# Patient Record
Sex: FEMALE | Race: WHITE | Employment: OTHER | URBAN - METROPOLITAN AREA
[De-identification: names, ages, dates, MRNs, and addresses within clinical notes are randomized per-mention and may not be internally consistent; named-entity substitution may affect disease eponyms.]

---

## 2023-10-27 DIAGNOSIS — M79.672 FOOT PAIN, LEFT: ICD-10-CM

## 2023-10-30 PROBLEM — E03.9 HYPOTHYROIDISM: Status: ACTIVE | Noted: 2023-10-30

## 2023-10-30 PROBLEM — E78.5 HYPERLIPIDEMIA: Status: ACTIVE | Noted: 2023-10-30

## 2023-10-30 PROBLEM — G43.909 MIGRAINE: Status: ACTIVE | Noted: 2023-10-30

## 2023-10-30 PROBLEM — K21.9 GERD (GASTROESOPHAGEAL REFLUX DISEASE): Status: ACTIVE | Noted: 2023-10-30

## 2023-10-30 PROBLEM — E34.9 DISORDER OF ENDOCRINE SYSTEM: Status: ACTIVE | Noted: 2023-10-30

## 2023-10-30 RX ORDER — LEVOTHYROXINE SODIUM 75 UG/1
75 TABLET ORAL
COMMUNITY
End: 2024-05-13 | Stop reason: WASHOUT

## 2023-10-30 RX ORDER — HYDROXYCHLOROQUINE SULFATE 200 MG/1
400 TABLET, FILM COATED ORAL
COMMUNITY
Start: 2023-04-18 | End: 2024-04-04 | Stop reason: ALTCHOICE

## 2023-10-30 RX ORDER — TOPIRAMATE 100 MG/1
100 TABLET, FILM COATED ORAL NIGHTLY
COMMUNITY
End: 2024-04-04 | Stop reason: ALTCHOICE

## 2023-10-30 RX ORDER — TESTOSTERONE MICRONIZED 100 %
POWDER (GRAM) MISCELLANEOUS
COMMUNITY

## 2023-10-30 RX ORDER — CYCLOSPORINE 0.5 MG/ML
1 EMULSION OPHTHALMIC 2 TIMES DAILY
COMMUNITY

## 2023-10-31 ENCOUNTER — HOSPITAL ENCOUNTER (OUTPATIENT)
Dept: RADIOLOGY | Facility: HOSPITAL | Age: 73
Discharge: HOME | End: 2023-10-31
Payer: MEDICARE

## 2023-10-31 ENCOUNTER — OFFICE VISIT (OUTPATIENT)
Dept: ORTHOPEDIC SURGERY | Facility: CLINIC | Age: 73
End: 2023-10-31
Payer: MEDICARE

## 2023-10-31 VITALS — HEIGHT: 63 IN | WEIGHT: 125 LBS | BODY MASS INDEX: 22.15 KG/M2

## 2023-10-31 DIAGNOSIS — M79.672 FOOT PAIN, LEFT: ICD-10-CM

## 2023-10-31 DIAGNOSIS — L84 CORN OF TOE: Primary | ICD-10-CM

## 2023-10-31 PROCEDURE — 1036F TOBACCO NON-USER: CPT | Performed by: SPECIALIST

## 2023-10-31 PROCEDURE — 1159F MED LIST DOCD IN RCRD: CPT | Performed by: SPECIALIST

## 2023-10-31 PROCEDURE — 73630 X-RAY EXAM OF FOOT: CPT | Mod: LT

## 2023-10-31 PROCEDURE — 73630 X-RAY EXAM OF FOOT: CPT | Mod: LEFT SIDE | Performed by: RADIOLOGY

## 2023-10-31 PROCEDURE — 99204 OFFICE O/P NEW MOD 45 MIN: CPT | Performed by: SPECIALIST

## 2023-10-31 NOTE — PROGRESS NOTES
"Left foot pain for 2-3 years.  States her 1st and 2nd toes rub together and cause calluses.   Has seen a podiatrist multiple times who has cut them.     Xrays done today.     NPV-   History of Present Illness  73 y.o.femaleNo diagnosis found.  Mechanism of injury: None just painful corn  Date of Injury/Pain: Chronic  Location of pain: Left second and third toes  Quality of pain: Almost nerve pain like  Frequency of Pain: worse with walking or standing  Associated symptoms? Swelling.  Previous treatment?  none        27 point review of systems negative except what is stated in HPI  Lungs clear to auscultation, heart regular rate and rhythm no murmur  On examination of the left ankle/foot:  Normal gait  Normal alignment  Minimal swelling and no ecchymosis of the lateral foot. no erythema. Skin intact; no ulcers or lesions.  Left medial second toe and lateral great toe \"kissing\" corns, with minimal left second toe varus.  No instability.  Normal ROM in  ankle plantarflexion, dorsiflexion, inversion and eversion; minimal Normal ROM in 2-5th toe flexion/extension and 1st MTP flexion/extension  Normal strength in ankle plantarflexion, dorsiflexion, inversion and eversion; normal strength with great toe flexion/extension. No pain with eversion  Neurovascularly intact. Good capillary refill. No sensory deficit to light touch. Normal proprioception. Dorsalis pedis and posterior tibial pulses 2+ bilaterally.    I personally reviewed the patient's x-ray images and reports of the left foot. The xrays show no acute abnormalities just the above first and second toe condylar pressure-point. No other fractures or dislocation.  Normal joint spacing     Assessment: Left first webspace first and second toe corns.  Options would be first web spacers with extra depth wide toe box shoe wear versus surgical condylectomy of the lateral great toe IP and medial second toe IP.  Patient is going to discuss this option with family and work and " consider scheduling.    PLAN: Treatment options were discussed with the patient.

## 2023-11-02 ENCOUNTER — TELEPHONE (OUTPATIENT)
Dept: ORTHOPEDIC SURGERY | Facility: CLINIC | Age: 73
End: 2023-11-02
Payer: MEDICARE

## 2023-11-02 DIAGNOSIS — L84 CORN OF TOE: ICD-10-CM

## 2023-11-02 NOTE — TELEPHONE ENCOUNTER
Please let me know what surgery needs scheduled, special needs, time needed etc.     And I will call her and get her scheduled.  Thank you.

## 2023-11-02 NOTE — TELEPHONE ENCOUNTER
Patient called in to discuss possible surgery date options. She states it was discussed a few Wednesday's that were available in December. She would like to know if she can do surgery on December 6th, 13th, or 27th. Please call patient and advise.     She was seen 10/31/23 for her left foot and the surgery scheduled would be a left foot condylectomy of the lateral great toe IP and medial second toe IP.

## 2023-11-03 ENCOUNTER — PREP FOR PROCEDURE (OUTPATIENT)
Dept: ORTHOPEDIC SURGERY | Facility: CLINIC | Age: 73
End: 2023-11-03
Payer: MEDICARE

## 2023-12-12 ENCOUNTER — TELEMEDICINE CLINICAL SUPPORT (OUTPATIENT)
Dept: PREADMISSION TESTING | Facility: HOSPITAL | Age: 73
End: 2023-12-12
Payer: MEDICARE

## 2023-12-12 RX ORDER — ERGOCALCIFEROL 1.25 MG/1
1.25 CAPSULE ORAL
COMMUNITY

## 2023-12-12 RX ORDER — LEVOTHYROXINE SODIUM 75 UG/1
88 TABLET ORAL
COMMUNITY
End: 2024-04-04 | Stop reason: SINTOL

## 2023-12-12 RX ORDER — ASPIRIN 81 MG/1
81 TABLET ORAL DAILY
COMMUNITY
End: 2024-05-13 | Stop reason: WASHOUT

## 2023-12-12 RX ORDER — LANOLIN ALCOHOL/MO/W.PET/CERES
500 CREAM (GRAM) TOPICAL DAILY
COMMUNITY

## 2023-12-12 NOTE — PREPROCEDURE INSTRUCTIONS
Medication List            Accurate as of December 12, 2023 10:26 AM. Always use your most recent med list.                ascorbic acid 500 mg ER capsule  Commonly known as: Vitamin C     aspirin 81 mg EC tablet     ergocalciferol 1.25 MG (35965 UT) capsule  Commonly known as: Vitamin D-2     hydroxychloroquine 200 mg tablet  Commonly known as: Plaquenil     IVERMECTIN ORAL     MELATONIN ORAL     multivitamin with minerals iron-free  Commonly known as: Centrum Silver     Restasis 0.05 % ophthalmic emulsion  Generic drug: cycloSPORINE     * Synthroid 75 mcg tablet  Generic drug: levothyroxine     * levothyroxine 75 mcg tablet  Commonly known as: Synthroid, Levoxyl     testosterone micronized (bulk) 100 % powder     topiramate 100 mg tablet  Commonly known as: Topamax           * This list has 2 medication(s) that are the same as other medications prescribed for you. Read the directions carefully, and ask your doctor or other care provider to review them with you.                                  NPO Instructions:    Do not eat any food after midnight the night before your surgery/procedure.    Additional Instructions:     Wear  comfortable loose fitting clothing  Do not use moisturizers, creams, lotions or perfume  All jewelry and valuables should be left at home    Must have a   Stop baby aspirin and multi vitamin on 12/20

## 2023-12-19 ENCOUNTER — ANESTHESIA EVENT (OUTPATIENT)
Dept: OPERATING ROOM | Facility: HOSPITAL | Age: 73
End: 2023-12-19
Payer: MEDICARE

## 2023-12-27 ENCOUNTER — HOSPITAL ENCOUNTER (OUTPATIENT)
Facility: HOSPITAL | Age: 73
Setting detail: OUTPATIENT SURGERY
Discharge: HOME | End: 2023-12-27
Attending: SPECIALIST | Admitting: SPECIALIST
Payer: MEDICARE

## 2023-12-27 ENCOUNTER — ANESTHESIA (OUTPATIENT)
Dept: OPERATING ROOM | Facility: HOSPITAL | Age: 73
End: 2023-12-27
Payer: MEDICARE

## 2023-12-27 ENCOUNTER — PHARMACY VISIT (OUTPATIENT)
Dept: PHARMACY | Facility: CLINIC | Age: 73
End: 2023-12-27

## 2023-12-27 ENCOUNTER — APPOINTMENT (OUTPATIENT)
Dept: CARDIOLOGY | Facility: HOSPITAL | Age: 73
End: 2023-12-27
Payer: MEDICARE

## 2023-12-27 ENCOUNTER — APPOINTMENT (OUTPATIENT)
Dept: RADIOLOGY | Facility: HOSPITAL | Age: 73
End: 2023-12-27
Payer: MEDICARE

## 2023-12-27 VITALS
OXYGEN SATURATION: 100 % | WEIGHT: 135 LBS | RESPIRATION RATE: 18 BRPM | HEART RATE: 80 BPM | SYSTOLIC BLOOD PRESSURE: 163 MMHG | TEMPERATURE: 98.3 F | DIASTOLIC BLOOD PRESSURE: 70 MMHG | HEIGHT: 61 IN | BODY MASS INDEX: 25.49 KG/M2

## 2023-12-27 DIAGNOSIS — L84 CORN OF TOE: Primary | ICD-10-CM

## 2023-12-27 LAB
ANION GAP SERPL CALC-SCNC: 11 MMOL/L (ref 10–20)
BUN SERPL-MCNC: 17 MG/DL (ref 6–23)
CALCIUM SERPL-MCNC: 10 MG/DL (ref 8.6–10.3)
CHLORIDE SERPL-SCNC: 106 MMOL/L (ref 98–107)
CO2 SERPL-SCNC: 25 MMOL/L (ref 21–32)
CREAT SERPL-MCNC: 0.93 MG/DL (ref 0.5–1.05)
ERYTHROCYTE [DISTWIDTH] IN BLOOD BY AUTOMATED COUNT: 13.6 % (ref 11.5–14.5)
GFR SERPL CREATININE-BSD FRML MDRD: 65 ML/MIN/1.73M*2
GLUCOSE SERPL-MCNC: 85 MG/DL (ref 74–99)
HCT VFR BLD AUTO: 44.3 % (ref 36–46)
HGB BLD-MCNC: 15 G/DL (ref 12–16)
MCH RBC QN AUTO: 31.8 PG (ref 26–34)
MCHC RBC AUTO-ENTMCNC: 33.9 G/DL (ref 32–36)
MCV RBC AUTO: 94 FL (ref 80–100)
NRBC BLD-RTO: 0 /100 WBCS (ref 0–0)
PLATELET # BLD AUTO: 243 X10*3/UL (ref 150–450)
POTASSIUM SERPL-SCNC: 3.9 MMOL/L (ref 3.5–5.3)
RBC # BLD AUTO: 4.72 X10*6/UL (ref 4–5.2)
SODIUM SERPL-SCNC: 138 MMOL/L (ref 136–145)
WBC # BLD AUTO: 7.9 X10*3/UL (ref 4.4–11.3)

## 2023-12-27 PROCEDURE — 2500000005 HC RX 250 GENERAL PHARMACY W/O HCPCS: Performed by: NURSE ANESTHETIST, CERTIFIED REGISTERED

## 2023-12-27 PROCEDURE — 2500000004 HC RX 250 GENERAL PHARMACY W/ HCPCS (ALT 636 FOR OP/ED): Performed by: SPECIALIST

## 2023-12-27 PROCEDURE — 7100000010 HC PHASE TWO TIME - EACH INCREMENTAL 1 MINUTE: Performed by: SPECIALIST

## 2023-12-27 PROCEDURE — 36415 COLL VENOUS BLD VENIPUNCTURE: CPT | Performed by: ANESTHESIOLOGY

## 2023-12-27 PROCEDURE — 3600000008 HC OR TIME - EACH INCREMENTAL 1 MINUTE - PROCEDURE LEVEL THREE: Performed by: SPECIALIST

## 2023-12-27 PROCEDURE — 2500000004 HC RX 250 GENERAL PHARMACY W/ HCPCS (ALT 636 FOR OP/ED): Performed by: ANESTHESIOLOGY

## 2023-12-27 PROCEDURE — 3700000002 HC GENERAL ANESTHESIA TIME - EACH INCREMENTAL 1 MINUTE: Performed by: SPECIALIST

## 2023-12-27 PROCEDURE — 7100000002 HC RECOVERY ROOM TIME - EACH INCREMENTAL 1 MINUTE: Performed by: SPECIALIST

## 2023-12-27 PROCEDURE — 7100000001 HC RECOVERY ROOM TIME - INITIAL BASE CHARGE: Performed by: SPECIALIST

## 2023-12-27 PROCEDURE — RXMED WILLOW AMBULATORY MEDICATION CHARGE

## 2023-12-27 PROCEDURE — 28153 PARTIAL REMOVAL OF TOE: CPT | Performed by: SPECIALIST

## 2023-12-27 PROCEDURE — 93010 ELECTROCARDIOGRAM REPORT: CPT | Performed by: INTERNAL MEDICINE

## 2023-12-27 PROCEDURE — 3700000001 HC GENERAL ANESTHESIA TIME - INITIAL BASE CHARGE: Performed by: SPECIALIST

## 2023-12-27 PROCEDURE — 93005 ELECTROCARDIOGRAM TRACING: CPT

## 2023-12-27 PROCEDURE — 85027 COMPLETE CBC AUTOMATED: CPT | Performed by: ANESTHESIOLOGY

## 2023-12-27 PROCEDURE — 80048 BASIC METABOLIC PNL TOTAL CA: CPT | Performed by: ANESTHESIOLOGY

## 2023-12-27 PROCEDURE — 2500000004 HC RX 250 GENERAL PHARMACY W/ HCPCS (ALT 636 FOR OP/ED): Performed by: NURSE ANESTHETIST, CERTIFIED REGISTERED

## 2023-12-27 PROCEDURE — 2720000007 HC OR 272 NO HCPCS: Performed by: SPECIALIST

## 2023-12-27 PROCEDURE — 7100000009 HC PHASE TWO TIME - INITIAL BASE CHARGE: Performed by: SPECIALIST

## 2023-12-27 PROCEDURE — 3600000003 HC OR TIME - INITIAL BASE CHARGE - PROCEDURE LEVEL THREE: Performed by: SPECIALIST

## 2023-12-27 RX ORDER — SODIUM CHLORIDE 9 MG/ML
50 INJECTION, SOLUTION INTRAVENOUS CONTINUOUS
Status: DISCONTINUED | OUTPATIENT
Start: 2023-12-27 | End: 2023-12-27 | Stop reason: HOSPADM

## 2023-12-27 RX ORDER — DEXAMETHASONE SODIUM PHOSPHATE 4 MG/ML
INJECTION, SOLUTION INTRA-ARTICULAR; INTRALESIONAL; INTRAMUSCULAR; INTRAVENOUS; SOFT TISSUE AS NEEDED
Status: DISCONTINUED | OUTPATIENT
Start: 2023-12-27 | End: 2023-12-27

## 2023-12-27 RX ORDER — HYDRALAZINE HYDROCHLORIDE 20 MG/ML
5 INJECTION INTRAMUSCULAR; INTRAVENOUS ONCE
Status: COMPLETED | OUTPATIENT
Start: 2023-12-27 | End: 2023-12-27

## 2023-12-27 RX ORDER — ONDANSETRON HYDROCHLORIDE 2 MG/ML
4 INJECTION, SOLUTION INTRAVENOUS ONCE
Status: COMPLETED | OUTPATIENT
Start: 2023-12-27 | End: 2023-12-27

## 2023-12-27 RX ORDER — BUPIVACAINE HYDROCHLORIDE 5 MG/ML
INJECTION, SOLUTION PERINEURAL AS NEEDED
Status: DISCONTINUED | OUTPATIENT
Start: 2023-12-27 | End: 2023-12-27

## 2023-12-27 RX ORDER — MIDAZOLAM HYDROCHLORIDE 1 MG/ML
INJECTION, SOLUTION INTRAMUSCULAR; INTRAVENOUS AS NEEDED
Status: DISCONTINUED | OUTPATIENT
Start: 2023-12-27 | End: 2023-12-27

## 2023-12-27 RX ORDER — LIDOCAINE HYDROCHLORIDE AND EPINEPHRINE 20; 10 MG/ML; UG/ML
INJECTION, SOLUTION INFILTRATION; PERINEURAL AS NEEDED
Status: DISCONTINUED | OUTPATIENT
Start: 2023-12-27 | End: 2023-12-27

## 2023-12-27 RX ORDER — BUPIVACAINE HYDROCHLORIDE 2.5 MG/ML
INJECTION, SOLUTION EPIDURAL; INFILTRATION; INTRACAUDAL AS NEEDED
Status: DISCONTINUED | OUTPATIENT
Start: 2023-12-27 | End: 2023-12-27

## 2023-12-27 RX ORDER — HYDROCODONE BITARTRATE AND ACETAMINOPHEN 5; 325 MG/1; MG/1
1 TABLET ORAL EVERY 4 HOURS PRN
Qty: 15 TABLET | Refills: 0 | Status: SHIPPED | OUTPATIENT
Start: 2023-12-27 | End: 2024-04-04 | Stop reason: WASHOUT

## 2023-12-27 RX ORDER — CEFAZOLIN SODIUM 1 G/50ML
1 SOLUTION INTRAVENOUS ONCE
Status: COMPLETED | OUTPATIENT
Start: 2023-12-27 | End: 2023-12-27

## 2023-12-27 RX ORDER — MORPHINE SULFATE 2 MG/ML
2 INJECTION, SOLUTION INTRAMUSCULAR; INTRAVENOUS EVERY 5 MIN PRN
Status: DISCONTINUED | OUTPATIENT
Start: 2023-12-27 | End: 2023-12-27 | Stop reason: HOSPADM

## 2023-12-27 RX ORDER — ONDANSETRON HYDROCHLORIDE 2 MG/ML
4 INJECTION, SOLUTION INTRAVENOUS ONCE AS NEEDED
Status: DISCONTINUED | OUTPATIENT
Start: 2023-12-27 | End: 2023-12-27 | Stop reason: HOSPADM

## 2023-12-27 RX ORDER — LIDOCAINE HYDROCHLORIDE 20 MG/ML
INJECTION, SOLUTION INFILTRATION; PERINEURAL AS NEEDED
Status: DISCONTINUED | OUTPATIENT
Start: 2023-12-27 | End: 2023-12-27

## 2023-12-27 RX ORDER — ESMOLOL HYDROCHLORIDE 10 MG/ML
INJECTION INTRAVENOUS AS NEEDED
Status: DISCONTINUED | OUTPATIENT
Start: 2023-12-27 | End: 2023-12-27

## 2023-12-27 RX ORDER — MORPHINE SULFATE 4 MG/ML
4 INJECTION INTRAVENOUS EVERY 5 MIN PRN
Status: DISCONTINUED | OUTPATIENT
Start: 2023-12-27 | End: 2023-12-27 | Stop reason: HOSPADM

## 2023-12-27 RX ORDER — FAMOTIDINE 10 MG/ML
20 INJECTION INTRAVENOUS ONCE
Status: COMPLETED | OUTPATIENT
Start: 2023-12-27 | End: 2023-12-27

## 2023-12-27 RX ORDER — PROPOFOL 10 MG/ML
INJECTION, EMULSION INTRAVENOUS AS NEEDED
Status: DISCONTINUED | OUTPATIENT
Start: 2023-12-27 | End: 2023-12-27

## 2023-12-27 RX ORDER — FENTANYL CITRATE 50 UG/ML
INJECTION, SOLUTION INTRAMUSCULAR; INTRAVENOUS AS NEEDED
Status: DISCONTINUED | OUTPATIENT
Start: 2023-12-27 | End: 2023-12-27

## 2023-12-27 RX ADMIN — MIDAZOLAM HYDROCHLORIDE 2 MG: 1 INJECTION, SOLUTION INTRAMUSCULAR; INTRAVENOUS at 11:16

## 2023-12-27 RX ADMIN — BUPIVACAINE HYDROCHLORIDE 25 ML: 5 INJECTION, SOLUTION PERINEURAL at 11:16

## 2023-12-27 RX ADMIN — HYDRALAZINE HYDROCHLORIDE 5 MG: 20 INJECTION INTRAMUSCULAR; INTRAVENOUS at 14:15

## 2023-12-27 RX ADMIN — CEFAZOLIN SODIUM 1 G: 1 INJECTION, SOLUTION INTRAVENOUS at 11:59

## 2023-12-27 RX ADMIN — PROPOFOL 200 MG: 10 INJECTION, EMULSION INTRAVENOUS at 12:11

## 2023-12-27 RX ADMIN — LIDOCAINE HYDROCHLORIDE 50 MG: 20 INJECTION, SOLUTION INFILTRATION; PERINEURAL at 12:11

## 2023-12-27 RX ADMIN — DEXAMETHASONE SODIUM PHOSPHATE 4 MG: 4 INJECTION INTRA-ARTICULAR; INTRALESIONAL; INTRAMUSCULAR; INTRAVENOUS; SOFT TISSUE at 12:11

## 2023-12-27 RX ADMIN — BUPIVACAINE HYDROCHLORIDE 17 ML: 2.5 INJECTION, SOLUTION EPIDURAL; INFILTRATION; INTRACAUDAL; PERINEURAL at 11:16

## 2023-12-27 RX ADMIN — HYDRALAZINE HYDROCHLORIDE 5 MG: 20 INJECTION INTRAMUSCULAR; INTRAVENOUS at 13:40

## 2023-12-27 RX ADMIN — ESMOLOL HYDROCHLORIDE 20 MG: 10 INJECTION, SOLUTION INTRAVENOUS at 12:25

## 2023-12-27 RX ADMIN — DEXAMETHASONE SODIUM PHOSPHATE 8 MG: 4 INJECTION INTRA-ARTICULAR; INTRALESIONAL; INTRAMUSCULAR; INTRAVENOUS; SOFT TISSUE at 11:16

## 2023-12-27 RX ADMIN — ONDANSETRON 4 MG: 2 INJECTION INTRAMUSCULAR; INTRAVENOUS at 10:37

## 2023-12-27 RX ADMIN — LIDOCAINE HYDROCHLORIDE,EPINEPHRINE BITARTRATE 12 ML: 20; .01 INJECTION, SOLUTION INFILTRATION; PERINEURAL at 11:16

## 2023-12-27 RX ADMIN — FENTANYL CITRATE 100 MCG: 50 INJECTION, SOLUTION INTRAMUSCULAR; INTRAVENOUS at 11:16

## 2023-12-27 RX ADMIN — SODIUM CHLORIDE 50 ML/HR: 9 INJECTION, SOLUTION INTRAVENOUS at 10:37

## 2023-12-27 RX ADMIN — FAMOTIDINE 20 MG: 10 INJECTION, SOLUTION INTRAVENOUS at 10:37

## 2023-12-27 SDOH — HEALTH STABILITY: MENTAL HEALTH: CURRENT SMOKER: 0

## 2023-12-27 ASSESSMENT — COLUMBIA-SUICIDE SEVERITY RATING SCALE - C-SSRS
1. IN THE PAST MONTH, HAVE YOU WISHED YOU WERE DEAD OR WISHED YOU COULD GO TO SLEEP AND NOT WAKE UP?: NO
6. HAVE YOU EVER DONE ANYTHING, STARTED TO DO ANYTHING, OR PREPARED TO DO ANYTHING TO END YOUR LIFE?: NO
2. HAVE YOU ACTUALLY HAD ANY THOUGHTS OF KILLING YOURSELF?: NO

## 2023-12-27 ASSESSMENT — PAIN SCALES - GENERAL
PAINLEVEL_OUTOF10: 0 - NO PAIN
PAIN_LEVEL: 0
PAINLEVEL_OUTOF10: 0 - NO PAIN

## 2023-12-27 ASSESSMENT — PAIN - FUNCTIONAL ASSESSMENT

## 2023-12-27 NOTE — ANESTHESIA POSTPROCEDURE EVALUATION
Patient: Nanette Deal    Procedure Summary       Date: 12/27/23 Room / Location: [facility] 03 / [facility]    Anesthesia Start: 1159 Anesthesia Stop: 1250    Procedure: Left great toe and second toe condylectomy's.general + block. (Left: Toes) Diagnosis:       Corn of toe      (Corn of toe [L84])    Surgeons: Tato Pardo MD Responsible Provider: MAMIE Will    Anesthesia Type: general ASA Status: 2            Anesthesia Type: general    Vitals Value Taken Time   /70 12/27/23 1440   Temp 36.8 °C (98.3 °F) 12/27/23 1440   Pulse 80 12/27/23 1440   Resp 18 12/27/23 1440   SpO2 100 % 12/27/23 1440       Anesthesia Post Evaluation    Patient location during evaluation: PACU  Patient participation: complete - patient participated  Level of consciousness: awake and alert  Pain score: 0  Pain management: adequate  Multimodal analgesia pain management approach  Airway patency: patent  Cardiovascular status: acceptable  Respiratory status: acceptable  Hydration status: acceptable  Postoperative Nausea and Vomiting: none  Comments: POSTOP SYSTOLIC HTN TX'D WITH APRESOLINE 10 mg TITRATED.  PREOP SYSTOLIC 200's.  LAST SYSTOLIC 160 IN PACU TRENDING DOWN.  PT INSTRUCTED TO F/U WITH PCP ASAP TO HAVE BP EVALUATED. PT AGREES.    No notable events documented.

## 2023-12-27 NOTE — ANESTHESIA PROCEDURE NOTES
Peripheral Block    Start time: 12/27/2023 11:15 AM  End time: 12/27/2023 11:46 AM  Reason for block: at surgeon's request and post-op pain management  Staffing  Performed: CRNA   Authorized by: MAMIE Will    Performed by: MAMIE Rowe  Preanesthetic Checklist  Completed: patient identified, IV checked, site marked, risks and benefits discussed, surgical consent, monitors and equipment checked, pre-op evaluation and timeout performed   Timeout performed at: 12/27/2023 10:51 AM  Peripheral Block  Patient position: laying flat  Prep: ChloraPrep  Patient monitoring: heart rate, cardiac monitor and continuous pulse ox  Block type: other (saphenous)  Laterality: right  Injection technique: single-shot  Local infiltration: bupivicaine  Infiltration strength: 0.3 %  Dose: 17 mL  Needle  Needle type: 25g x 1.5 inch.   Needle localization: anatomical landmarks  Test dose: negative  Assessment  Injection assessment: negative aspiration for heme, no paresthesia on injection and incremental injection  Paresthesia pain: none  Heart rate change: no  Slow fractionated injection: yes  Additional Notes  See block note #1.  Subpatellar Saphenous block placed easily, sterile, X1.  Injected as above. Tolerated very well. Excellent early effect.

## 2023-12-27 NOTE — ANESTHESIA PROCEDURE NOTES
Airway  Date/Time: 12/27/2023 12:06 PM  Urgency: elective    Airway not difficult    Staffing  Performed: CRNA   Authorized by: MAMIE Will    Performed by: AMMIE Will  Patient location during procedure: OR    Indications and Patient Condition  Indications for airway management: anesthesia  Spontaneous ventilation: present  Sedation level: deep  Preoxygenated: yes  Patient position: sniffing  Mask difficulty assessment: 1 - vent by mask    Final Airway Details  Final airway type: supraglottic airway      Successful airway: classic  Size 4     Number of attempts at approach: 1

## 2023-12-27 NOTE — ANESTHESIA PREPROCEDURE EVALUATION
Patient: Nanette Deal    Procedure Information       Date/Time: 12/27/23 1200    Procedure: Left great toe and second toe condylectomy's.general + block. (Left: Toes) - general + block. CPT 28153 x2, surgery 30 minutes, small sagittal saw.    Location: [facility] 03 / [facility]    Surgeons: Tato Pardo MD            Relevant Problems   Cardiovascular   (+) Hyperlipidemia      Endocrine   (+) Hypothyroidism      GI   (+) GERD (gastroesophageal reflux disease)       Clinical information reviewed:   Tobacco  Allergies  Meds  Problems  Med Hx  Surg Hx   Fam Hx  Soc   Hx        NPO Detail:  NPO/Void Status  Date of Last Liquid: 12/26/23  Time of Last Liquid: 1700  Date of Last Solid: 12/26/23  Time of Last Solid: 1900  Last Intake Type: Clear fluids         Physical Exam    Airway  Mallampati: II  TM distance: >3 FB  Neck ROM: full     Cardiovascular - normal exam  Rhythm: regular  Rate: normal     Dental - normal exam     Pulmonary - normal exam  Breath sounds clear to auscultation     Abdominal   Abdomen: soft           Anesthesia Plan    ASA 2     general and regional   (Block with GA discussed)  The patient is not a current smoker.    intravenous induction   Anesthetic plan and risks discussed with patient.  Use of blood products discussed with patient who.    Plan discussed with CRNA.

## 2023-12-27 NOTE — H&P
history Of Present Illness  Nanette Deal is a 73 y.o. female presenting with here for surgical correction left foot first web:.     Past Medical History  She has a past medical history of Breast cancer (CMS/[facility]), Disease of thyroid gland, GERD (gastroesophageal reflux disease), and Hyperlipidemia.    Surgical History  She has a past surgical history that includes Mastectomy (02/20/2018) and Hysterectomy (02/20/2018).     Social History  She reports that she has never smoked. She has never used smokeless tobacco. She reports that she does not drink alcohol and does not use drugs.    Family History  Family History   Problem Relation Name Age of Onset    Stroke Mother      Diabetes Father          Allergies  Meperidine       Lungs clear to auscultation  Heart regular rate and rhythm, no murmur normal S1-S2  Left foot with painful corn lateral great toe IPJ and medial second toe PIP     Last Recorded Vitals  There were no vitals taken for this visit.    Relevant Results      Scheduled medications  ceFAZolin, 1 g, intravenous, Once  famotidine, 20 mg, intravenous, Once  ondansetron, 4 mg, intravenous, Once      Continuous medications  sodium chloride 0.9%, 50 mL/hr      PRN medications    No results found for this or any previous visit (from the past 24 hour(s)).    Assessment/Plan   Principal Problem:    Corn of toe      Patient has informed consent signed for condylectomy's left great toe and second toe.       I spent 10 minutes in the professional and overall care of this patient.      Tato Pardo MD

## 2023-12-27 NOTE — ANESTHESIA PROCEDURE NOTES
Peripheral Block    Patient location during procedure: pre-op  Start time: 12/27/2023 11:15 AM  End time: 12/27/2023 11:46 AM  Reason for block: at surgeon's request and post-op pain management  Staffing  Performed: CRNA   Authorized by: MAMIE Will    Performed by: MAMIE Rowe  Preanesthetic Checklist  Completed: patient identified, IV checked, site marked, risks and benefits discussed, surgical consent, monitors and equipment checked, pre-op evaluation and timeout performed   Timeout performed at: 12/27/2023 10:51 AM  Peripheral Block  Patient position: prone.  Prep: ChloraPrep  Patient monitoring: heart rate, cardiac monitor and continuous pulse ox  Block type: popliteal  Laterality: right  Injection technique: single-shot  Guidance: nerve stimulator  Local infiltration: ropivacaine  Infiltration strength: 0.5 %  Dose: 20 mL  Needle  Needle type: stimulator.   Needle gauge: 21 G  Needle localization: anatomical landmarks and nerve stimulator  Test dose: negative and lidocaine 1.5% with epinephrine 1-to-200,000  Assessment  Injection assessment: negative aspiration for heme, no paresthesia on injection and incremental injection  Paresthesia pain: none  Heart rate change: no  Slow fractionated injection: yes  Additional Notes  Anesthesia consult was placed by  _________Edvin__ for post procedural analgesia. The patients chart was reviewed and they were deemed an appropriate candidate for the procedure. The patient was educated in detail on the risks, benefits, and alternatives to the block including but not limited to: temporary or permanent nerve damage, bleeding, and infection, damage to surrounding tissues, possible block failure, and the potential for local anesthesia toxicity syndrome. The patient expressed understanding and all questions were answered prior to initiation of the procedure. Informed consent was also signed by the patient and laterality determined per institutional  "policy. A formal \"time out\" consistent with the institutions rules and regulations were performed by the anesthesia provider and appropriate RN.    Procedure  The patient was placed in the PRONE/LATERAL/SUPINE position. The LEFT/RIGHT side was marked and skin prep applied and allowed to dry. Proper monitors were applied with oxygen. Sedation was provided by administering:    Versed ____2_ mg IV  Fentanyl __100__mcg IV    A high frequency linear ultrasound probe with probe cover was placed over the popliteal fossa and sciatic nerve with popliteal vascular structures identified using sterile coupling gel following institutional skin prep.  The popliteal vein was easily collapsible, and popliteal artery found to be grossly normal under color Doppler flow prior to the procedure. An ECHOGENIC BLOCK NEEDLE was then advanced maintaining an in-plane visualization throughout the procedure, under ultrasound guidance from lateral to medial, to come to rest just deep to the sciatic neurovascular sheath at the level of the bifurcation, but avoiding contact of the common peroneal nerve. A positive motor response was visualized from the nerve stimulator. A loss of response was seen at 0.46______ mAmp. Upon negative aspiration, 5ml 2% Lidocaine, 20ml 0.5% Bupivacaine with 4mg decadron preservative free was administered safely and cautiously around the nerve structure. Aspiration every 3-5 ml was done to avoid potential intravascular injection.  All injections were done without resistance and were free of blood.  The patient tolerated the procedure well without report of intense pain, tinnitus, metallic taste, or circumoral numbness.  The block was then evaluated after a few minutes and appeared to be functioning appropriately.                "

## 2023-12-27 NOTE — OP NOTE
Left great toe and second toe condylectomy's.general + block. (L) Operative Note     Date: 2023  OR Location: [facility]    Name: Nanette Deal, : 1950, Age: 73 y.o., MRN: [id number], Sex: female    Diagnosis  Pre-op Diagnosis     * Corn of toe [L84] Post-op Diagnosis     * Corn of toe [L84]     Procedures  Left great toe and second toe condylectomy's.general + block.  81415 - IN RESECTION CONDYLE DISTAL END PHALANX EACH TOE      Surgeons      * Tato Pardo - Primary    Resident/Fellow/Other Assistant:  Surgeon(s) and Role:    Procedure Summary  Anesthesia: General  ASA: II  Anesthesia Staff: CRNA: CHANDNI Will-CRNA  Estimated Blood Loss: 2 mL  Intra-op Medications: * No intraprocedure medications in log *           Anesthesia Record               Intraprocedure I/O Totals       None           Specimen: No specimens collected     Staff:   Circulator: Frances Lowery RN  Scrub Person: Leeann Wilder         Drains and/or Catheters: * None in log *    Tourniquet Times:   * Missing tourniquet times found for documented tourniquets in lo *     Implants:     Findings: Prominent condyles great toe and second toe with associated callus    Indications: Nanette Deal is an 73 y.o. female who is having surgery for Corn of toe [L84].  Failed conservative treatment wished to proceed with surgery    The patient was seen in the preoperative area. The risks, benefits, complications, treatment options, non-operative alternatives, expected recovery and outcomes were discussed with the patient. The possibilities of reaction to medication, pulmonary aspiration, injury to surrounding structures, bleeding, recurrent infection, the need for additional procedures, failure to diagnose a condition, and creating a complication requiring transfusion or operation were discussed with the patient. The patient concurred with the proposed plan, giving informed consent.  The site of surgery was properly  noted/marked if necessary per policy. The patient has been actively warmed in preoperative area. Preoperative antibiotics have been ordered and given within 1 hours of incision. Venous thrombosis prophylaxis have been ordered including unilateral sequential compression device    Procedure Details: Patient brought to the OR and placed supine on the OR table after successful popliteal block.  The left lower extremity was then sterilely prepped and draped below a calf tourniquet.  At the start of the case we exsanguinated the left foot and ankle in excess elevated the tourniquet to 250 mmHg.  With a 15 blade we made a dorsal longitudinal incision over the left second toe PIP joint and the right great toe IP joint.  After sharp dissection of the dermis on both toes Sharp and blunt dissection expose the lateral condyle of the great toe IP joint and the medial condyle of the second toe PIP joint.  With a sagittal saw the condyles of the subsequent toes were removed.  We then copiously irrigated both surgical sites and closed the incisions with interrupted two 4-0 nylon suture.  Sterile dressing was applied tourniquet was let down tourniquet time under half hour and there is good perfusion of the foot at the end of the case  Complications:  None; patient tolerated the procedure well.    Disposition: PACU - hemodynamically stable.  Condition: stable         Additional Details: None    Attending Attestation: I was present for the entire procedure.    Tato Pardo  Phone Number: [phone]

## 2023-12-28 LAB
ATRIAL RATE: 68 BPM
P AXIS: 68 DEGREES
PR INTERVAL: 130 MS
Q ONSET: 251 MS
QRS COUNT: 11 BEATS
QRS DURATION: 90 MS
QT INTERVAL: 413 MS
QTC CALCULATION(BAZETT): 443 MS
QTC FREDERICIA: 432 MS
R AXIS: 73 DEGREES
T AXIS: 56 DEGREES
T OFFSET: 457 MS
VENTRICULAR RATE: 69 BPM

## 2023-12-28 ASSESSMENT — PAIN SCALES - GENERAL: PAINLEVEL_OUTOF10: 0 - NO PAIN

## 2024-01-09 ENCOUNTER — OFFICE VISIT (OUTPATIENT)
Dept: ORTHOPEDIC SURGERY | Facility: CLINIC | Age: 74
End: 2024-01-09
Payer: MEDICARE

## 2024-01-09 DIAGNOSIS — L84 CORN OF TOE: Primary | ICD-10-CM

## 2024-01-09 PROCEDURE — 1126F AMNT PAIN NOTED NONE PRSNT: CPT | Performed by: SPECIALIST

## 2024-01-09 PROCEDURE — 1036F TOBACCO NON-USER: CPT | Performed by: SPECIALIST

## 2024-01-09 PROCEDURE — 99024 POSTOP FOLLOW-UP VISIT: CPT | Performed by: SPECIALIST

## 2024-01-09 NOTE — PROGRESS NOTES
Left great toe and second toe condylectomy's.   12/27/2023 - suture removal today with no concerns     Exam: Left foot great toe and second toe incision is well-healed minimal swelling normal neurovascular status.  No signs of infection.  Dermis intact.    Plan: Sutures removed today Steri-Strips applied.  She can begin showering the foot.  Will do a reassessment final in a month.  Told patient she may require final callus debridement when skin more stable.

## 2024-02-02 ENCOUNTER — APPOINTMENT (OUTPATIENT)
Dept: PRIMARY CARE | Facility: CLINIC | Age: 74
End: 2024-02-02
Payer: MEDICARE

## 2024-02-06 ENCOUNTER — OFFICE VISIT (OUTPATIENT)
Dept: ORTHOPEDIC SURGERY | Facility: CLINIC | Age: 74
End: 2024-02-06
Payer: MEDICARE

## 2024-02-06 VITALS — HEIGHT: 61 IN | BODY MASS INDEX: 25.49 KG/M2 | WEIGHT: 135 LBS

## 2024-02-06 DIAGNOSIS — L84 CORN OF TOE: Primary | ICD-10-CM

## 2024-02-06 PROCEDURE — 99024 POSTOP FOLLOW-UP VISIT: CPT | Performed by: SPECIALIST

## 2024-02-06 PROCEDURE — 1159F MED LIST DOCD IN RCRD: CPT | Performed by: SPECIALIST

## 2024-02-06 PROCEDURE — 1126F AMNT PAIN NOTED NONE PRSNT: CPT | Performed by: SPECIALIST

## 2024-02-06 PROCEDURE — 1036F TOBACCO NON-USER: CPT | Performed by: SPECIALIST

## 2024-02-06 NOTE — PROGRESS NOTES
Left great toe and second toe condylectomy's.   12/27/2023    Doing well, walking well with normal shoes on    Exam: Left foot with minimal swelling to the second and first toes.  Incisions well-healed.  Painful callus has resolved.  There is normal vascular status no other significant findings.    Assessment/plan: Status post successful condylectomy's left great and second toes.  Resume all normal shoewear and activities as of tolerated.  Follow-up

## 2024-04-04 ENCOUNTER — OFFICE VISIT (OUTPATIENT)
Dept: PRIMARY CARE | Facility: CLINIC | Age: 74
End: 2024-04-04
Payer: MEDICARE

## 2024-04-04 VITALS
HEIGHT: 64 IN | BODY MASS INDEX: 23.39 KG/M2 | HEART RATE: 74 BPM | WEIGHT: 137 LBS | OXYGEN SATURATION: 99 % | DIASTOLIC BLOOD PRESSURE: 86 MMHG | TEMPERATURE: 97.4 F | SYSTOLIC BLOOD PRESSURE: 180 MMHG

## 2024-04-04 DIAGNOSIS — G71.00 MUSCULAR DYSTROPHY (MULTI): ICD-10-CM

## 2024-04-04 DIAGNOSIS — Z78.0 ASYMPTOMATIC POSTMENOPAUSAL STATE: ICD-10-CM

## 2024-04-04 DIAGNOSIS — E78.2 MIXED HYPERLIPIDEMIA: ICD-10-CM

## 2024-04-04 DIAGNOSIS — Z00.00 ROUTINE GENERAL MEDICAL EXAMINATION AT A HEALTH CARE FACILITY: Primary | ICD-10-CM

## 2024-04-04 DIAGNOSIS — Z85.3 HX: BREAST CANCER: ICD-10-CM

## 2024-04-04 DIAGNOSIS — E03.9 ACQUIRED HYPOTHYROIDISM: ICD-10-CM

## 2024-04-04 DIAGNOSIS — M85.80 OSTEOPENIA, SENILE: ICD-10-CM

## 2024-04-04 DIAGNOSIS — I10 PRIMARY HYPERTENSION: ICD-10-CM

## 2024-04-04 PROBLEM — Z90.710 H/O: HYSTERECTOMY: Status: ACTIVE | Noted: 2022-09-13

## 2024-04-04 PROCEDURE — 3079F DIAST BP 80-89 MM HG: CPT | Performed by: FAMILY MEDICINE

## 2024-04-04 PROCEDURE — 99203 OFFICE O/P NEW LOW 30 MIN: CPT | Performed by: FAMILY MEDICINE

## 2024-04-04 PROCEDURE — 1036F TOBACCO NON-USER: CPT | Performed by: FAMILY MEDICINE

## 2024-04-04 PROCEDURE — 3077F SYST BP >= 140 MM HG: CPT | Performed by: FAMILY MEDICINE

## 2024-04-04 PROCEDURE — 1159F MED LIST DOCD IN RCRD: CPT | Performed by: FAMILY MEDICINE

## 2024-04-04 RX ORDER — LOSARTAN POTASSIUM 25 MG/1
25 TABLET ORAL DAILY
Qty: 90 TABLET | Refills: 3 | Status: SHIPPED | OUTPATIENT
Start: 2024-04-04 | End: 2025-04-04

## 2024-04-04 ASSESSMENT — ENCOUNTER SYMPTOMS
DEPRESSION: 0
LOSS OF SENSATION IN FEET: 0
OCCASIONAL FEELINGS OF UNSTEADINESS: 0

## 2024-04-04 ASSESSMENT — PATIENT HEALTH QUESTIONNAIRE - PHQ9
1. LITTLE INTEREST OR PLEASURE IN DOING THINGS: NOT AT ALL
2. FEELING DOWN, DEPRESSED OR HOPELESS: NOT AT ALL
SUM OF ALL RESPONSES TO PHQ9 QUESTIONS 1 AND 2: 0

## 2024-04-04 NOTE — PROGRESS NOTES
"Subjective   Patient ID: Nanette Deal is a 74 y.o. female who presents for Hypertension (Was refused surgery because BP was too high).  74-year-old female presenting for transfer of care.    Past medical history significant for breast cancer status postmastectomy,osteopenia GERD, hyperlipidemia, migraine, hypothyroidism    Follows w/ Dr. Kearns   Hormones     Breast cancer diagnosis in 1986 and status postmastectomy of left breast and breast reconstruction    Last labs completed in December 2023  BMP within normal limits CBC within normal limits  Other Specialists:   Podiatry    Screening Tests:   Former Smoker: Never  Current Smoker: Never  0 pack year Smoking History  Lung Cancer Screening: Not indicated  Colon Cancer Screening: 3 colonoscopies; last being couple years; recall 10 years   Breast Cancer Screening: last mammogram 2021  Doesn't want to get   Cervical Cancer Screening: No longer indicated, status post hysterectomy  Bone density screening:      Objective   /86 (BP Location: Left arm, Patient Position: Sitting, BP Cuff Size: Adult)   Pulse 74   Temp 36.3 °C (97.4 °F) (Skin)   Ht 1.626 m (5' 4\")   Wt 62.1 kg (137 lb)   SpO2 99%   BMI 23.52 kg/m²     Physical Exam  Constitutional:       General: She is not in acute distress.     Appearance: Normal appearance. She is not ill-appearing, toxic-appearing or diaphoretic.   HENT:      Head: Normocephalic and atraumatic.   Eyes:      Extraocular Movements: Extraocular movements intact.      Pupils: Pupils are equal, round, and reactive to light.   Cardiovascular:      Rate and Rhythm: Normal rate and regular rhythm.      Pulses: Normal pulses.      Heart sounds: Normal heart sounds.   Pulmonary:      Effort: Pulmonary effort is normal.      Breath sounds: Normal breath sounds.   Neurological:      Mental Status: She is alert.         Assessment/Plan   Diagnoses and all orders for this visit:  Routine general medical examination at a [facility]" facility  Osteopenia, senile  Muscular dystrophy (CMS/[facility])  HX: breast cancer  Acquired hypothyroidism  Mixed hyperlipidemia  Asymptomatic postmenopausal state  Primary hypertension  -     losartan (Cozaar) 25 mg tablet; Take 1 tablet (25 mg) by mouth once daily.    Nanette Deal is a 74-year-old female presenting for establish care and annual Medicare wellness exam.  Today we discussed routine screenings including colonoscopy and mammogram.  Last colonoscopy was within the last couple of years.  Her last mammogram was done in 2021 and she is not interested in getting another one despite her history of breast cancer in her 30s.  She understands the risks versus benefits.  She gets lab work done with her physician she sees an echo and Dr. Kearns.  She will send over lab results when she gets this done with Dr. Kearns.  Will start her on losartan 25 mg for blood pressure control.  Follow-up in 3 months.  She will take her blood pressure at home at least once daily.  Discussed that she can message sooner if her blood pressure is consistently below 110 systolic.  Since her blood pressures at home are typically better controlled than in the office.    Ivory Joy DO     I spent a total of 34 minutes on the date of the service which included preparing to see the patient, face-to-face patient care, completing clinical documentation, performing a medically appropriate examination, counseling and educating the patient/family/caregiver and ordering medications, tests, or procedures.

## 2024-04-15 ENCOUNTER — TELEPHONE (OUTPATIENT)
Dept: PRIMARY CARE | Facility: CLINIC | Age: 74
End: 2024-04-15
Payer: MEDICARE

## 2024-04-15 NOTE — TELEPHONE ENCOUNTER
Pt left a msg stating that her surgeon is looking for a medical clearance.  I see she had an appt on 4/4.  She said to call her for the fax #.

## 2024-04-15 NOTE — TELEPHONE ENCOUNTER
Patient is calling the surgeons office and requesting a clearance form be sent to Dr. Joy.  We were unaware of a need for surgical clearance and patient may require another visit in order to clear as her BP was elevated previously.

## 2024-04-15 NOTE — TELEPHONE ENCOUNTER
Left a voicemail and sent a My Chart message inquiring as to what kind of surgery she is having.  [place] mentioned at [facility] [place]4-24

## 2024-04-23 ENCOUNTER — OFFICE VISIT (OUTPATIENT)
Dept: PRIMARY CARE | Facility: CLINIC | Age: 74
End: 2024-04-23
Payer: MEDICARE

## 2024-04-23 VITALS
SYSTOLIC BLOOD PRESSURE: 164 MMHG | BODY MASS INDEX: 23.28 KG/M2 | OXYGEN SATURATION: 99 % | WEIGHT: 135.6 LBS | TEMPERATURE: 97.5 F | DIASTOLIC BLOOD PRESSURE: 82 MMHG | HEART RATE: 74 BPM

## 2024-04-23 DIAGNOSIS — Z01.818 PREOPERATIVE CLEARANCE: Primary | ICD-10-CM

## 2024-04-23 DIAGNOSIS — R94.31 ABNORMAL EKG: ICD-10-CM

## 2024-04-23 DIAGNOSIS — R00.2 PALPITATIONS: ICD-10-CM

## 2024-04-23 DIAGNOSIS — I49.9 CARDIAC ARRHYTHMIA, UNSPECIFIED CARDIAC ARRHYTHMIA TYPE: ICD-10-CM

## 2024-04-23 NOTE — PROGRESS NOTES
Subjective   Patient ID: Nanette Deal is a 74 y.o. female who presents for Surgical Clearance.    Nanette Deal  is here today for a pre-op clearance.  Blood pressure at home is 105-110    Date of surgery: May 7   Who is the doctor performing the surgery: Dr. Lees  What is the patient having done: Neck Lift & & FaceLift   Does the patient have a form that needs to be filled out: yes  Tests required for clearance: CBC, EKG     RCRI risk factors:   High-risk surgery (intrathoracic, intraabdominal or vascular surgery): no  Coronary artery disease or MI: no  Congestive heart failure:no  Stroke: no  Insulin treatment for diabetes mellitus:  no  Preoperative serum creatinine > 2.0 mg/dL: no    History of problems with anesthesia: nausea to demerol in 80's   Family history of problems with anesthesia: unknown  History of difficult intubation: no  Exercise capacity: able to walk four blocks without symptoms.  Lifestyle factors: denies alcohol and tobacco use.  Symptoms: none, no chest pain, no dyspnea, no edema and no palpitations.        The American College of Cardiology recommends use of a validated tool to assess risk for major adverse cardiac events in order to complete preoperative risk assessment (surgical clearance evaluation).     One such tool is the RCRI score (1 point for each).   High-risk surgery (intrathoracic, intraabdominal or vascular surgery):  Coronary artery disease or MI:0  Congestive heart failure: 0  Stroke or TIA: 0  Insulin treatment for diabetes mellitus: 0  Preoperative serum creatinine > 2.0 mg/dL: 0    You have 0 of these (score 0).     A score of 0 or 1 indicates low risk of major cardiac adverse events.  If risk by this calculator is elevated (2 or more), risk is determined by functional capacity.    If excellent functional capacity- 10 mets or more, such as strenuous sports participation- clear for surgery.    If good functional capacity- 7-10 mets, such as run a short distance,  climb stairs, doubles tennis- clear for surgery.    If moderate functional capacity- 4-6 mets, such as walk on level ground at 4mph, do light housework- clear for surgery.    If poor functional capacity- 1-3 mets, such as eating, dressing, walking a block or two at 2-3 mph, but cardiac symptoms at any higher activity- need cardiac consultation, consideration of stress test or cath before procedure.      You are at good  functional capacity.       Objective   /82 (BP Location: Left arm, Patient Position: Sitting, BP Cuff Size: Adult)   Pulse 74   Temp 36.4 °C (97.5 °F) (Skin)   Wt 61.5 kg (135 lb 9.6 oz)   SpO2 99%   BMI 23.28 kg/m²     Physical Exam  Constitutional:       General: She is not in acute distress.     Appearance: Normal appearance. She is not ill-appearing, toxic-appearing or diaphoretic.   HENT:      Head: Normocephalic and atraumatic.   Eyes:      Extraocular Movements: Extraocular movements intact.      Pupils: Pupils are equal, round, and reactive to light.   Cardiovascular:      Rate and Rhythm: Normal rate and regular rhythm.      Pulses: Normal pulses.      Heart sounds: Normal heart sounds.   Pulmonary:      Effort: Pulmonary effort is normal.      Breath sounds: Normal breath sounds.   Neurological:      Mental Status: She is alert.       Assessment/Plan   Diagnoses and all orders for this visit:  Preoperative clearance  -     ECG 12 lead (Clinic Performed)  -     CBC; Future    Presents for preoperative clearance.  He is having surgery on May 5 with Dr. Lees for neck and facelift.  Overall health is good and she is low risk for procedure however he first in office EKG that was completed did show abnormality in V3 and therefore the EKG was repeated repeat EKG showed irregularity however unclear about clinical correlation with the abnormal EKG.  There were some T wave discrepancy in lead V3 and V6.  However the remaining leads show normal sinus rhythm.  We repeated the EKG a  second time to ensure that this was not just artifact and lead placement and so adjusting meds were made accordingly and the EKG repeated with the same results.  When comparing this to an EKG that was completed in December 2023 this finding is new..  I will send this EKG over to Dr. Bonilla to review to determine next best steps and if further testing needs to be completed.  However until I have further information and input from cardiology I cannot confirm that she is medically optimized for surgery.      Lab work as ordered.  She does have history of whitecoat hypertension.  In office her blood pressure was 164/82 but home blood pressure readings are in the 100-110 range.        Ivory Joy,      I spent a total of 45 minutes on the date of the service which included preparing to see the patient, face-to-face patient care, completing clinical documentation, performing a medically appropriate examination, counseling and educating the patient/family/caregiver and ordering medications, tests, or procedures.

## 2024-04-24 ENCOUNTER — TELEPHONE (OUTPATIENT)
Dept: PRIMARY CARE | Facility: CLINIC | Age: 74
End: 2024-04-24
Payer: MEDICARE

## 2024-04-24 NOTE — TELEPHONE ENCOUNTER
Hellen, from [facility], left a msg wanting to know why the pt needs cardia clearance since the EKG you sent says normal sinus rhythm.  The patient is now having anxiety that she has to have multiple tests done with cardiology, and that it's going to cause issues with rescheduling her surgery.  Please call and explain why the extra cardiology testing is necessary.

## 2024-04-25 ENCOUNTER — TELEPHONE (OUTPATIENT)
Dept: PRIMARY CARE | Facility: CLINIC | Age: 74
End: 2024-04-25
Payer: MEDICARE

## 2024-04-25 NOTE — TELEPHONE ENCOUNTER
Lvm for patient to call back regarding surgical clearance and cardiac clearance.  Would like to speak with the patient regarding this.

## 2024-04-30 ENCOUNTER — HOSPITAL ENCOUNTER (OUTPATIENT)
Dept: CARDIOLOGY | Facility: CLINIC | Age: 74
Discharge: HOME | End: 2024-04-30
Payer: MEDICARE

## 2024-04-30 DIAGNOSIS — R00.2 PALPITATIONS: ICD-10-CM

## 2024-04-30 DIAGNOSIS — I49.9 CARDIAC ARRHYTHMIA, UNSPECIFIED CARDIAC ARRHYTHMIA TYPE: ICD-10-CM

## 2024-04-30 DIAGNOSIS — R94.31 ABNORMAL EKG: ICD-10-CM

## 2024-04-30 PROCEDURE — 93242 EXT ECG>48HR<7D RECORDING: CPT

## 2024-04-30 PROCEDURE — 93244 EXT ECG>48HR<7D REV&INTERPJ: CPT | Performed by: INTERNAL MEDICINE

## 2024-05-03 ENCOUNTER — TELEPHONE (OUTPATIENT)
Dept: PRIMARY CARE | Facility: CLINIC | Age: 74
End: 2024-05-03
Payer: MEDICARE

## 2024-05-03 NOTE — TELEPHONE ENCOUNTER
Ijeoma, Dr. Raulito Obando's office, left a msg stating that the pt has an echo scheduled for 5/10, and her monitor results should be back soon as well.  They want to reschedule the pt's surgery for 5/14/24, an would like the clearance signed.

## 2024-05-03 NOTE — TELEPHONE ENCOUNTER
Called and left voice message for Ijeoma.  I have received the surgical clearance form however I will not be signing this form until cardiac clearance is complete.  The patient is wearing a Holter monitor at this time and has an echo scheduled for May 10.  Pending the results of these tests will determine if the patient is optimized for surgery or not.  I do not know how long it will take the echo to be read or for the Holter monitor to be completed.  I cannot guarantee that this will be before May 14.

## 2024-05-03 NOTE — TELEPHONE ENCOUNTER
Ijeoma, [facility], left a msg stating that the pt is wanting to get this surgery done before June due to life events that will happen in June.  Ijeoma is believing that the Echo and the Monitor results will be back so that they can do the surgery on 5/14/24, and would like to get your clearance back so that it can be scheduled.

## 2024-05-07 DIAGNOSIS — Z13.1 SCREENING FOR DIABETES MELLITUS: ICD-10-CM

## 2024-05-07 DIAGNOSIS — E03.9 ACQUIRED HYPOTHYROIDISM: Primary | ICD-10-CM

## 2024-05-07 DIAGNOSIS — E78.2 MIXED HYPERLIPIDEMIA: ICD-10-CM

## 2024-05-07 NOTE — TELEPHONE ENCOUNTER
TRAVIS Raphael on the day of this call (5/3) stating that we will sign surgical clearance after results are received & interpreted. Will not sign paperwork until I can ensure all resutls are normal.

## 2024-05-09 ENCOUNTER — LAB (OUTPATIENT)
Dept: LAB | Facility: LAB | Age: 74
End: 2024-05-09
Payer: MEDICARE

## 2024-05-09 DIAGNOSIS — E78.2 MIXED HYPERLIPIDEMIA: ICD-10-CM

## 2024-05-09 DIAGNOSIS — Z01.818 PREOPERATIVE CLEARANCE: ICD-10-CM

## 2024-05-09 DIAGNOSIS — E03.9 ACQUIRED HYPOTHYROIDISM: ICD-10-CM

## 2024-05-09 LAB
ANION GAP SERPL CALC-SCNC: 10 MMOL/L (ref 10–20)
BUN SERPL-MCNC: 18 MG/DL (ref 6–23)
CALCIUM SERPL-MCNC: 10.1 MG/DL (ref 8.6–10.3)
CHLORIDE SERPL-SCNC: 108 MMOL/L (ref 98–107)
CO2 SERPL-SCNC: 27 MMOL/L (ref 21–32)
CREAT SERPL-MCNC: 0.86 MG/DL (ref 0.5–1.05)
EGFRCR SERPLBLD CKD-EPI 2021: 71 ML/MIN/1.73M*2
ERYTHROCYTE [DISTWIDTH] IN BLOOD BY AUTOMATED COUNT: 13.5 % (ref 11.5–14.5)
GLUCOSE SERPL-MCNC: 85 MG/DL (ref 74–99)
HCT VFR BLD AUTO: 43.5 % (ref 36–46)
HGB BLD-MCNC: 14.8 G/DL (ref 12–16)
MCH RBC QN AUTO: 32.4 PG (ref 26–34)
MCHC RBC AUTO-ENTMCNC: 34 G/DL (ref 32–36)
MCV RBC AUTO: 95 FL (ref 80–100)
NRBC BLD-RTO: 0 /100 WBCS (ref 0–0)
PLATELET # BLD AUTO: 235 X10*3/UL (ref 150–450)
POTASSIUM SERPL-SCNC: 4.8 MMOL/L (ref 3.5–5.3)
RBC # BLD AUTO: 4.57 X10*6/UL (ref 4–5.2)
SODIUM SERPL-SCNC: 140 MMOL/L (ref 136–145)
TSH SERPL-ACNC: 0.7 MIU/L (ref 0.44–3.98)
WBC # BLD AUTO: 7.4 X10*3/UL (ref 4.4–11.3)

## 2024-05-09 PROCEDURE — 80048 BASIC METABOLIC PNL TOTAL CA: CPT

## 2024-05-09 PROCEDURE — 85027 COMPLETE CBC AUTOMATED: CPT

## 2024-05-09 PROCEDURE — 84443 ASSAY THYROID STIM HORMONE: CPT

## 2024-05-09 PROCEDURE — 36415 COLL VENOUS BLD VENIPUNCTURE: CPT

## 2024-05-10 ENCOUNTER — HOSPITAL ENCOUNTER (OUTPATIENT)
Dept: CARDIOLOGY | Facility: CLINIC | Age: 74
Discharge: HOME | End: 2024-05-10
Payer: MEDICARE

## 2024-05-10 DIAGNOSIS — R94.31 ABNORMAL EKG: ICD-10-CM

## 2024-05-10 DIAGNOSIS — I49.9 CARDIAC ARRHYTHMIA, UNSPECIFIED CARDIAC ARRHYTHMIA TYPE: ICD-10-CM

## 2024-05-10 LAB
AORTIC VALVE MEAN GRADIENT: 6 MMHG
AORTIC VALVE PEAK VELOCITY: 1.59 M/S
AV PEAK GRADIENT: 10.2 MMHG
AVA (PEAK VEL): 1.64 CM2
AVA (VTI): 1.91 CM2
EJECTION FRACTION APICAL 4 CHAMBER: 73.2
LEFT ATRIUM VOLUME AREA LENGTH INDEX BSA: 26.3 ML/M2
LEFT VENTRICLE INTERNAL DIMENSION DIASTOLE: 4.45 CM (ref 3.5–6)
LEFT VENTRICULAR OUTFLOW TRACT DIAMETER: 1.82 CM
LV EJECTION FRACTION BIPLANE: 74 %
MITRAL VALVE E/A RATIO: 0.77
MITRAL VALVE E/E' RATIO: 9.91
RIGHT VENTRICLE FREE WALL PEAK S': 16.03 CM/S
TRICUSPID ANNULAR PLANE SYSTOLIC EXCURSION: 2.9 CM

## 2024-05-10 PROCEDURE — 93306 TTE W/DOPPLER COMPLETE: CPT

## 2024-05-10 PROCEDURE — 93306 TTE W/DOPPLER COMPLETE: CPT | Performed by: INTERNAL MEDICINE

## 2024-05-13 ENCOUNTER — DOCUMENTATION (OUTPATIENT)
Dept: PRIMARY CARE | Facility: CLINIC | Age: 74
End: 2024-05-13

## 2024-05-13 ENCOUNTER — TELEPHONE (OUTPATIENT)
Dept: CARDIOLOGY | Facility: CLINIC | Age: 74
End: 2024-05-13

## 2024-05-13 ENCOUNTER — OFFICE VISIT (OUTPATIENT)
Dept: CARDIOLOGY | Facility: CLINIC | Age: 74
End: 2024-05-13
Payer: MEDICARE

## 2024-05-13 VITALS
SYSTOLIC BLOOD PRESSURE: 186 MMHG | TEMPERATURE: 98 F | HEIGHT: 63 IN | WEIGHT: 136 LBS | BODY MASS INDEX: 24.1 KG/M2 | HEART RATE: 78 BPM | DIASTOLIC BLOOD PRESSURE: 79 MMHG

## 2024-05-13 DIAGNOSIS — I10 ESSENTIAL HYPERTENSION: Primary | ICD-10-CM

## 2024-05-13 DIAGNOSIS — R94.31 ABNORMAL EKG: ICD-10-CM

## 2024-05-13 DIAGNOSIS — Z01.810 PREOPERATIVE CARDIOVASCULAR EXAMINATION: ICD-10-CM

## 2024-05-13 PROCEDURE — 99204 OFFICE O/P NEW MOD 45 MIN: CPT | Performed by: INTERNAL MEDICINE

## 2024-05-13 PROCEDURE — 99214 OFFICE O/P EST MOD 30 MIN: CPT | Performed by: INTERNAL MEDICINE

## 2024-05-13 PROCEDURE — 3077F SYST BP >= 140 MM HG: CPT | Performed by: INTERNAL MEDICINE

## 2024-05-13 PROCEDURE — 1036F TOBACCO NON-USER: CPT | Performed by: INTERNAL MEDICINE

## 2024-05-13 PROCEDURE — 3078F DIAST BP <80 MM HG: CPT | Performed by: INTERNAL MEDICINE

## 2024-05-13 PROCEDURE — 1159F MED LIST DOCD IN RCRD: CPT | Performed by: INTERNAL MEDICINE

## 2024-05-13 PROCEDURE — 1160F RVW MEDS BY RX/DR IN RCRD: CPT | Performed by: INTERNAL MEDICINE

## 2024-05-13 RX ORDER — CREAM BASE NO.56
CREAM (GRAM) TOPICAL
COMMUNITY

## 2024-05-13 RX ORDER — PROGESTERONE 200 MG/1
1 CAPSULE ORAL
COMMUNITY

## 2024-05-13 RX ORDER — LEVOTHYROXINE SODIUM 88 UG/1
TABLET ORAL
COMMUNITY
Start: 2024-05-10

## 2024-05-13 NOTE — PROGRESS NOTES
Patient completed visit and work-up for abnormal EKG including Holter monitor and Echocardiogram. Testing was unremarkable. Pt is medically optimized for surgery.

## 2024-05-13 NOTE — PROGRESS NOTES
Chief Complaint:   Pre-op Clearance     History of Present Illness     Nanette Deal is a 74 y.o. female presenting with pre-operative cardiac evaluation before planned plastic facial surgery.  The patient has no prior history of coronary artery disease, myocardial infarction, PCI (stent), CABG, heart failure, high-grade arrhythmias, valvular heart disease, pulmonary hypertension, peripheral arterial disease, or cerebrovascular disease.  The patient has no personal or family history of anesthetic complications during prior general anesthesia.  The patient has no history of DVT or PE.  Based upon the patient's present history, their functional capacity is greater than 4 METS.  Exercises walking 20 mins/1 mile daily. Patients’ ability to expend >=4 METs can be assessed by estimates from activities of daily living; activities that expend >=4 METS include the ability to climb up a flight of stairs, walk up a hill, walk at ground level at 4 miles per hour, or perform heavy work around the house.  The patient denies chest pain or dyspnea on exertion.  The patient has no history of obstructive sleep apnea, alcohol abuse use, or tobacco use.  Pre-op laboratory studies were unremarkable. .       Previous History     Past Medical History:  She has a past medical history of Abnormal EKG (05/13/2024), Breast cancer (Multi), Disease of thyroid gland, Essential hypertension (05/13/2024), GERD (gastroesophageal reflux disease), Hyperlipidemia, and Preoperative cardiovascular examination (05/13/2024).    Past Surgical History:  She has a past surgical history that includes Mastectomy (02/20/2018); Hysterectomy (02/20/2018); and XR foot (Left, 12/27/2023).      Social History:  She reports that she has never smoked. She has been exposed to tobacco smoke. She has never used smokeless tobacco. She reports that she does not currently use alcohol. She reports that she does not use drugs.    Family History:  Family History   Problem  "Relation Name Age of Onset    Stroke Mother      Diabetes Father          Allergies:  Meperidine    Outpatient Medications:  Current Outpatient Medications   Medication Instructions    ascorbic acid (VITAMIN C) 500 mg, oral, Daily    calcium carb/vit D3/minerals (CALCIUM CARBONATE-VIT D-MIN PO) 1,200 mg, oral, Daily RT    cream base no.56, bulk, cream Estriol /PG/Test Cream .2/15/.2mg .1 ml daily.    cyanocobalamin (VITAMIN B-12) 50 mcg, oral, Daily    ergocalciferol (VITAMIN D-2) 1.25 mg, oral, Once Weekly    GINKGO BILOBA ORAL 100 mg, oral, Daily RT    losartan (COZAAR) 25 mg, oral, Daily    MELATONIN ORAL oral    multivitamin with minerals iron-free (Centrum Silver) 1 tablet, oral, Daily    OMEGA-3 FATTY ACIDS ORAL oral, Daily RT    progesterone (Prometrium) 200 mg capsule 1 capsule, oral, Daily RT    Restasis 0.05 % ophthalmic emulsion 1 drop, Both Eyes, 2 times daily    Synthroid 88 mcg tablet     testosterone micronized, bulk, 100 % powder This is a compounded cream       Physical Examination   Vitals:  Visit Vitals  BP (!) 186/79   Pulse 78   Temp 36.7 °C (98 °F)   Ht 1.6 m (5' 3\")   Wt 61.7 kg (136 lb)   BMI 24.09 kg/m²   Smoking Status Never   BSA 1.66 m²           Labs/Imaging/Cardiac Studies     Last Labs:  CBC -  Lab Results   Component Value Date    WBC 7.4 05/09/2024    HGB 14.8 05/09/2024    HCT 43.5 05/09/2024    MCV 95 05/09/2024     05/09/2024       CMP -  Lab Results   Component Value Date    CALCIUM 10.1 05/09/2024       LIPID PANEL -   No results found for: \"CHOL\", \"HDL\", \"CHHDL\", \"LDL\", \"VLDL\", \"TRIG\", \"NHDL\"    RENAL FUNCTION PANEL -   Lab Results   Component Value Date    K 4.8 05/09/2024       No results found for: \"BNP\", \"HGBA1C\"    ECG:    Echo:  Transthoracic Echo (TTE) Complete    Result Date: 5/10/2024   [facility], [address] Search to Phone.[place], [place] [address]               Tel [phone] and Fax [phone] TRANSTHORACIC ECHOCARDIOGRAM REPORT  Patient Name:      " NICKIE Denton Physician:    [id number] Mike Ambrose MD Study Date:        5/10/2024            Ordering Provider:    [id number] ASHLEY JIMENEZ MRN/PID:           [id number]             Fellow: Accession#:        [id number]         Nurse: Date of Birth/Age: 1950 / 74 years Sonographer:          Kiley GUIDO Gender:            F                    Additional Staff: Height:            162.56 cm            Admit Date: Weight:            61.23 kg             Admission Status:     Outpatient BSA / BMI:         1.66 m2 / 23.17      Encounter#:           [id number]                    kg/m2                                         Department Location:  [facility] Blood Pressure: 164 /82 mmHg Study Type:    TRANSTHORACIC ECHO (TTE) COMPLETE Diagnosis/ICD: Cardiac arrhythmia, unspecified-I49.9 Indication:    Abnormal EKG CPT Code:      Echo Complete w Full Doppler-[id number] Patient History: Pertinent History: Hyperlipidemia. Abnormal EKG, Hypothyroidism, Hx of breast                    cancer with mastectomy and reconstruction, GERD. Study Detail: The following Echo studies were performed: 2D, M-Mode, Doppler and               color flow. Technically challenging study due to prominent lung               artifact.  PHYSICIAN INTERPRETATION: Left Ventricle: The left ventricular systolic function is hyperdynamic, with an estimated ejection fraction of 70-75%. There are no regional wall motion abnormalities. The left ventricular cavity size is normal. Spectral Doppler shows a normal pattern of left ventricular diastolic filling. Left Atrium: The left atrium is normal in size. Right Ventricle: The right ventricle is normal in size. There is normal right ventricular global systolic function. Right Atrium: The right atrium is normal in size. Aortic Valve: The aortic valve is trileaflet. There is  mild aortic valve cusp calcification. The aortic valve dimensionless index is 0.74. There is no evidence of aortic valve regurgitation. The peak instantaneous gradient of the aortic valve is 10.2 mmHg. The mean gradient of the aortic valve is 6.0 mmHg. Mitral Valve: The mitral valve is normal in structure. There is mild mitral annular calcification. There is trace mitral valve regurgitation. Tricuspid Valve: The tricuspid valve is structurally normal. There is trace tricuspid regurgitation. The right ventricular systolic pressure is unable to be estimated. Pulmonic Valve: The pulmonic valve is structurally normal. The pulmonic valve regurgitation was not well visualized. Pericardium: There is a trivial pericardial effusion. Aorta: The aortic root is normal. The Ao Sinus is 2.50 cm. The Asc Ao is 2.40 cm. Systemic Veins: The inferior vena cava appears to be of normal size. There is IVC inspiratory collapse greater than 50%. In comparison to the previous echocardiogram(s): There are no prior studies on this patient for comparison purposes.  CONCLUSIONS:  1. Left ventricular systolic function is hyperdynamic with a 70-75% estimated ejection fraction. QUANTITATIVE DATA SUMMARY: 2D MEASUREMENTS:                          Normal Ranges: LAs:           4.08 cm   (2.7-4.0cm) RVIDd:         2.44 cm   (0.9-3.6cm) IVSd:          0.76 cm   (0.6-1.1cm) LVPWd:         0.74 cm   (0.6-1.1cm) LVIDd:         4.45 cm   (3.9-5.9cm) LVIDs:         2.83 cm LV Mass Index: 62.1 g/m2 LV % FS        36.4 % LA VOLUME:                               Normal Ranges: LA Vol A4C:        43.8 ml    (22+/-6mL/m2) LA Vol A2C:        41.5 ml LA Vol BP:         43.5 ml LA Vol Index A4C:  26.5ml/m2 LA Vol Index A2C:  25.0 ml/m2 LA Vol Index BP:   26.3 ml/m2 LA Area A4C:       15.4 cm2 LA Area A2C:       15.3 cm2 LA Major Axis A4C: 4.6 cm LA Major Axis A2C: 4.8 cm LA Volume Index:   26.3 ml/m2 LA Vol A4C:        41.7 ml LA Vol A2C:        39.2 ml RA VOLUME  BY A/L METHOD:                               Normal Ranges: RA Vol A4C:        21.4 ml    (8.3-19.5ml) RA Vol Index A4C:  12.9 ml/m2 RA Area A4C:       10.4 cm2 RA Major Axis A4C: 4.3 cm AORTA MEASUREMENTS:                      Normal Ranges: Ao Sinus, d: 2.50 cm (2.1-3.5cm) Asc Ao, d:   2.40 cm (2.1-3.4cm) LV SYSTOLIC FUNCTION BY 2D PLANIMETRY (MOD):                     Normal Ranges: EF-A4C View: 73.2 % (>=55%) EF-A2C View: 75.2 % EF-Biplane:  74.1 % LV DIASTOLIC FUNCTION:                               Normal Ranges: MV Peak E:        0.79 m/s    (0.7-1.2 m/s) MV Peak A:        1.04 m/s    (0.42-0.7 m/s) E/A Ratio:        0.77        (1.0-2.2) MV e'             0.08 m/s    (>8.0) MV lateral e'     0.08 m/s MV medial e'      0.08 m/s MV A Dur:         100.86 msec E/e' Ratio:       9.91        (<8.0) PulmV Sys Porfirio:    78.05 cm/s PulmV [place] Porfirio:   43.49 cm/s PulmV S/D Porfirio:    1.79 PulmV A Revs Porfirio: 25.93 cm/s PulmV A Revs Dur: 79.92 msec MITRAL VALVE:                 Normal Ranges: MV DT: 237 msec (150-240msec) AORTIC VALVE:                                    Normal Ranges: AoV Vmax:                1.59 m/s  (<=1.7m/s) AoV Peak PG:             10.2 mmHg (<20mmHg) AoV Mean P.0 mmHg  (1.7-11.5mmHg) LVOT Max Porfirio:            1.01 m/s  (<=1.1m/s) AoV VTI:                 32.07 cm  (18-25cm) LVOT VTI:                23.59 cm LVOT Diameter:           1.82 cm   (1.8-2.4cm) AoV Area, VTI:           1.91 cm2  (2.5-5.5cm2) AoV Area,Vmax:           1.64 cm2  (2.5-4.5cm2) AoV Dimensionless Index: 0.74  RIGHT VENTRICLE: RV Basal 3.10 cm RV Mid   2.40 cm RV Major 6.0 cm TAPSE:   29.4 mm RV s'    0.16 m/s TRICUSPID VALVE/RVSP:                           Normal Ranges: Est. RA Pressure: 3 mmHg IVC Diam:         1.60 cm PULMONIC VALVE:                         Normal Ranges: PV Accel Time: 88 msec  (>120ms) PV Max Porfirio:    1.1 m/s  (0.6-0.9m/s) PV Max P.2 mmHg PV Mean PG:    3.3 mmHg PV VTI:        22.69 cm  Pulmonary Veins: PulmV A Revs Dur: 79.92 msec PulmV A Revs Porfirio: 25.93 cm/s PulmV [place] Porfirio:   43.49 cm/s PulmV S/D Porfirio:    1.79 PulmV Sys Porfirio:    78.05 cm/s AORTA: Asc Ao Diam 2.45 cm  [id number] Mike Ambrose MD Electronically signed on 5/10/2024 at 4:43:29 PM  ** Final **         Assessment and Recommendations     Assessment/Plan     1. Essential hypertension  Home BP per patient has been normal.    2. Preoperative cardiovascular examination  Based upon the above evaluation and The 2014 American College of Cardiology/American Heart Association (ACC/AHA) Guideline on Perioperative Cardiovascular Evaluation and Management for noncardiac surgery, and calculation of the Revised Cardiac Risk Index (RCRI) score=0, the patient is at low risk (less than 1%) for an adverse cardiovascular event (ie, myocardial ischemia, myocardial infarction [MI], heart failure, arrhythmia, stroke, or cardiac death) for planned intermediate risk surgery.  No further cardiac testing is advised.      3. Abnormal EKG  Artifact on lead V3.  Normal Echo. Unremarkable Holter.         Anatoliy Bonilla MD    Exclusive of any other services or procedures performed, I, Anatoliy Bonilla MD , spent 30 minutes in duration for this visit today.  This time consisted of chart review, obtaining history, and/or performing the exam as documented above as well as documenting the clinical information for the encounter in the electronic record, discussing treatment options, plans, and/or goals with patient, family, and/or caregiver, refilling medications, updating the electronic record, ordering medicines, lab work, imaging, referrals, and/or procedures as documented above and communicating with other [facility] professionals. I have discussed the results of laboratory, radiology, and cardiology studies with the patient and their family/caregiver.

## 2024-07-11 ENCOUNTER — APPOINTMENT (OUTPATIENT)
Dept: PRIMARY CARE | Facility: CLINIC | Age: 74
End: 2024-07-11
Payer: MEDICARE

## 2024-08-15 ENCOUNTER — APPOINTMENT (OUTPATIENT)
Dept: PRIMARY CARE | Facility: CLINIC | Age: 74
End: 2024-08-15
Payer: MEDICARE

## 2024-08-15 VITALS
HEART RATE: 80 BPM | OXYGEN SATURATION: 98 % | TEMPERATURE: 97 F | WEIGHT: 137.6 LBS | BODY MASS INDEX: 24.37 KG/M2 | SYSTOLIC BLOOD PRESSURE: 134 MMHG | DIASTOLIC BLOOD PRESSURE: 76 MMHG

## 2024-08-15 DIAGNOSIS — E78.2 MIXED HYPERLIPIDEMIA: ICD-10-CM

## 2024-08-15 DIAGNOSIS — I10 PRIMARY HYPERTENSION: ICD-10-CM

## 2024-08-15 DIAGNOSIS — I10 ESSENTIAL HYPERTENSION: Primary | ICD-10-CM

## 2024-08-15 PROCEDURE — 1123F ACP DISCUSS/DSCN MKR DOCD: CPT | Performed by: FAMILY MEDICINE

## 2024-08-15 PROCEDURE — 1036F TOBACCO NON-USER: CPT | Performed by: FAMILY MEDICINE

## 2024-08-15 PROCEDURE — 3078F DIAST BP <80 MM HG: CPT | Performed by: FAMILY MEDICINE

## 2024-08-15 PROCEDURE — 3075F SYST BP GE 130 - 139MM HG: CPT | Performed by: FAMILY MEDICINE

## 2024-08-15 PROCEDURE — 99214 OFFICE O/P EST MOD 30 MIN: CPT | Performed by: FAMILY MEDICINE

## 2024-08-15 PROCEDURE — 1159F MED LIST DOCD IN RCRD: CPT | Performed by: FAMILY MEDICINE

## 2024-08-15 PROCEDURE — 1158F ADVNC CARE PLAN TLK DOCD: CPT | Performed by: FAMILY MEDICINE

## 2024-08-15 RX ORDER — LOSARTAN POTASSIUM 25 MG/1
25 TABLET ORAL DAILY
Qty: 90 TABLET | Refills: 3 | Status: SHIPPED | OUTPATIENT
Start: 2024-08-15 | End: 2025-08-15

## 2024-08-15 ASSESSMENT — PATIENT HEALTH QUESTIONNAIRE - PHQ9
SUM OF ALL RESPONSES TO PHQ9 QUESTIONS 1 AND 2: 0
1. LITTLE INTEREST OR PLEASURE IN DOING THINGS: NOT AT ALL
2. FEELING DOWN, DEPRESSED OR HOPELESS: NOT AT ALL

## 2024-08-15 ASSESSMENT — ENCOUNTER SYMPTOMS
DEPRESSION: 0
OCCASIONAL FEELINGS OF UNSTEADINESS: 0
LOSS OF SENSATION IN FEET: 0

## 2024-08-15 NOTE — PROGRESS NOTES
Subjective   Patient ID: Nanette Deal is a 74 y.o. female who presents for Hypertension.    Patient presenting for follow-up on hypertension.  She does take her blood pressure readings at home regularly.  She is typically in the 100-1 12 range systolically.  No issues with her current current dose of losartan.  She states that approximately 6 months ago/in December she had surgery on her foot and since then she has had difficulty losing the weight that she had gained while being sedentary from surgery.  She wanted to discuss any potential supplements that could help with weight loss.    Objective   /76 (BP Location: Right arm, Patient Position: Sitting, BP Cuff Size: Adult)   Pulse 80   Temp 36.1 °C (97 °F) (Skin)   Wt 62.4 kg (137 lb 9.6 oz)   SpO2 98%   BMI 24.37 kg/m²     Physical Exam  Constitutional:       Appearance: Normal appearance. She is normal weight.   HENT:      Head: Normocephalic and atraumatic.      Nose: Nose normal.      Mouth/Throat:      Mouth: Mucous membranes are moist.      Pharynx: Oropharynx is clear.   Eyes:      Extraocular Movements: Extraocular movements intact.      Conjunctiva/sclera: Conjunctivae normal.      Pupils: Pupils are equal, round, and reactive to light.   Pulmonary:      Effort: Pulmonary effort is normal.   Neurological:      General: No focal deficit present.      Mental Status: She is alert.   Psychiatric:         Mood and Affect: Mood normal.         Assessment/Plan   Diagnoses and all orders for this visit:  Essential hypertension  Primary hypertension  -     losartan (Cozaar) 25 mg tablet; Take 1 tablet (25 mg) by mouth once daily.  Mixed hyperlipidemia    The patient is here for 6-month follow-up on hypertension.  Blood pressure is well-controlled on current dosing of medications and therefore will provide refill.  Lab work is up-to-date.  Patient is interested in losing approximately 10 pounds as she had gained this weight loss being sedentary from a  surgery.  She was interested in over-the-counter supplements.  Her BMI is within normal range at 24.37 and she weighs 137 pounds she would ideally like to be in the 125-gilma range for weight.  I discussed that there are not any.  Effective supplements over-the-counter to help lose weight and to cautiously look into them if she does decide to go that route we talked about orlistat however there are significant side effects with stool/diarrhea.  She is going to consider it otherwise she is going to start working on resistance training with bands to help with muscle development and weight loss.    Ivory Joy,      I spent a total of 20 minutes on the date of the service which included preparing to see the patient, face-to-face patient care, completing clinical documentation, performing a medically appropriate examination, counseling and educating the patient/family/caregiver and ordering medications, tests, or procedures.

## 2024-10-08 DIAGNOSIS — Z12.31 ENCOUNTER FOR SCREENING MAMMOGRAM FOR BREAST CANCER: ICD-10-CM

## 2024-10-24 ENCOUNTER — HOSPITAL ENCOUNTER (OUTPATIENT)
Dept: RADIOLOGY | Facility: CLINIC | Age: 74
Discharge: HOME | End: 2024-10-24
Payer: MEDICARE

## 2024-10-24 DIAGNOSIS — Z12.31 ENCOUNTER FOR SCREENING MAMMOGRAM FOR BREAST CANCER: ICD-10-CM

## 2024-10-24 PROCEDURE — 77067 SCR MAMMO BI INCL CAD: CPT | Mod: 52,RT

## 2024-11-01 ENCOUNTER — HOSPITAL ENCOUNTER (OUTPATIENT)
Dept: RADIOLOGY | Facility: EXTERNAL LOCATION | Age: 74
Discharge: HOME | End: 2024-11-01

## 2025-02-27 DIAGNOSIS — I10 PRIMARY HYPERTENSION: ICD-10-CM

## 2025-02-27 RX ORDER — LOSARTAN POTASSIUM 25 MG/1
25 TABLET ORAL DAILY
Qty: 90 TABLET | Refills: 1 | Status: SHIPPED | OUTPATIENT
Start: 2025-02-27 | End: 2025-08-26

## 2025-03-17 ENCOUNTER — TELEPHONE (OUTPATIENT)
Dept: PRIMARY CARE | Facility: CLINIC | Age: 75
End: 2025-03-17
Payer: MEDICARE

## 2025-03-17 DIAGNOSIS — R42 DIZZINESS: Primary | ICD-10-CM

## 2025-03-17 NOTE — TELEPHONE ENCOUNTER
Patient went to [facility] physical therapy for dizziness without a prescription.  They are wondering if you would send over an order for physical therapy for dizziness.  They did already treat the patient without orders as they will take patients without.  However, Medicare will not pay for the session unless one is faxed to:  [phone]

## 2025-09-02 DIAGNOSIS — I10 PRIMARY HYPERTENSION: ICD-10-CM

## 2025-09-02 RX ORDER — LOSARTAN POTASSIUM 25 MG/1
25 TABLET ORAL DAILY
Qty: 90 TABLET | Refills: 0 | Status: SHIPPED | OUTPATIENT
Start: 2025-09-02 | End: 2026-03-01

## 2025-10-09 ENCOUNTER — APPOINTMENT (OUTPATIENT)
Dept: PRIMARY CARE | Facility: CLINIC | Age: 75
End: 2025-10-09
Payer: MEDICARE

## (undated) DEVICE — DRAPE, C-ARM, FLUROSCAN, MINI

## (undated) DEVICE — SUTURE, ETHILON, 3-0, 18 IN, PS1, BLACK

## (undated) DEVICE — Device

## (undated) DEVICE — ADHESIVE, SKIN, MASTISOL, 2/3 CC VIAL

## (undated) DEVICE — TAPE, CAST, SCOTCHCAST, PLUS, 4 IN X 4 YD, FIBERGLASS, WHITE

## (undated) DEVICE — SOLUTION, SALINE, 100ML

## (undated) DEVICE — CAP, PROTECTIVE, BALL, JURGAN, 1.6 MM PIN, GREEN

## (undated) DEVICE — SUTURE, VICRYL, 4-0, 18 IN, PS2, UNDYED

## (undated) DEVICE — DRAPE COVER, C ARM, FLOUROSCAN IMAGING SYS

## (undated) DEVICE — SPONGE, LAP, XRAY DECT, 18IN X 18IN, W/MASTER DMT, STERILE

## (undated) DEVICE — COVER HANDLE LIGHT, STERIS, BLUE, STERILE

## (undated) DEVICE — BANDAGE, COMPRESSION, EZE-BAND, 4 X 11 YDS

## (undated) DEVICE — BUR, SOLID OVAL, CARBIDE, MEDIUM, 4MM

## (undated) DEVICE — CUFF, TOURNIQUET, DUAL PORT, SNG BLADDER, 34 IN, PLC

## (undated) DEVICE — TOWEL PACK, STERILE, 4/PACK, BLUE

## (undated) DEVICE — SYRINGE, 20 CC, LUER LOCK, MONOJECT, W/O CAP, LF

## (undated) DEVICE — GLOVE, SURGICAL, PROTEXIS PI BLUE W/NEUTHERA, 8.0, PF, LF

## (undated) DEVICE — SPONGE, GAUZE, XRAY DECT, 16 PLY, 4 X 4, W/MASTER DMT,STERILE

## (undated) DEVICE — SYRINGE, 10 CC, LUER LOCK

## (undated) DEVICE — DRESSING, GAUZE, PETROLATUM, STRIP, XEROFORM, 1 X 8 IN, STERILE

## (undated) DEVICE — DRAPE, SHEET, THREE QUARTER, FAN FOLD, 57 X 77 IN

## (undated) DEVICE — PADDING, CAST, SOFT, 4 IN

## (undated) DEVICE — APPLICATOR, CHLORAPREP, W/ORANGE TINT, 26ML

## (undated) DEVICE — DRESSING, ABDOMINAL, WET PRUF, TENDERSORB, 5 X 9 IN, STERILE

## (undated) DEVICE — BLADE, FINETEETH 4.1 X 25.3 X .4

## (undated) DEVICE — DRAPE, SHEET, U, STERI DRAPE, 47 X 51 IN, DISPOSABLE, STERILE